# Patient Record
Sex: MALE | Race: WHITE | NOT HISPANIC OR LATINO | Employment: OTHER | URBAN - NONMETROPOLITAN AREA
[De-identification: names, ages, dates, MRNs, and addresses within clinical notes are randomized per-mention and may not be internally consistent; named-entity substitution may affect disease eponyms.]

---

## 2020-11-30 ENCOUNTER — OFFICE VISIT (OUTPATIENT)
Dept: CARDIOLOGY | Facility: CLINIC | Age: 45
End: 2020-11-30

## 2020-11-30 VITALS
BODY MASS INDEX: 42.66 KG/M2 | HEART RATE: 75 BPM | DIASTOLIC BLOOD PRESSURE: 89 MMHG | HEIGHT: 72 IN | WEIGHT: 315 LBS | OXYGEN SATURATION: 97 % | SYSTOLIC BLOOD PRESSURE: 146 MMHG | TEMPERATURE: 97.8 F

## 2020-11-30 DIAGNOSIS — R07.2 PRECORDIAL PAIN: ICD-10-CM

## 2020-11-30 DIAGNOSIS — R06.02 SOB (SHORTNESS OF BREATH): ICD-10-CM

## 2020-11-30 DIAGNOSIS — I10 ESSENTIAL HYPERTENSION: ICD-10-CM

## 2020-11-30 DIAGNOSIS — R00.2 PALPITATIONS: ICD-10-CM

## 2020-11-30 PROCEDURE — 99204 OFFICE O/P NEW MOD 45 MIN: CPT | Performed by: PHYSICIAN ASSISTANT

## 2020-11-30 PROCEDURE — 93000 ELECTROCARDIOGRAM COMPLETE: CPT | Performed by: PHYSICIAN ASSISTANT

## 2020-11-30 RX ORDER — LISINOPRIL 10 MG/1
5 TABLET ORAL DAILY
COMMUNITY
Start: 2020-10-16

## 2020-11-30 RX ORDER — FLUTICASONE PROPIONATE 50 MCG
2 SPRAY, SUSPENSION (ML) NASAL DAILY
COMMUNITY

## 2020-11-30 RX ORDER — CETIRIZINE HYDROCHLORIDE 10 MG/1
TABLET ORAL DAILY
COMMUNITY
Start: 2020-11-17

## 2020-11-30 RX ORDER — CYPROHEPTADINE HYDROCHLORIDE 4 MG/1
TABLET ORAL 2 TIMES DAILY
COMMUNITY
Start: 2020-11-06

## 2020-11-30 NOTE — PATIENT INSTRUCTIONS

## 2020-11-30 NOTE — PROGRESS NOTES
Subjective   Monster Moore is a 44 y.o. male     Chief Complaint   Patient presents with   • Establish Care     presents for cardiac eval   • Palpitations   • Shortness of Breath       HPI  The patient presents to reestablish care, referred because of symptoms of chest discomfort, dyspnea, and palpitations otherwise.  The patient was seen a number of years ago through the clinic and had a largely benign cardiovascular work-up at that time.  He was lost to follow-up for unknown reasons but is referred back after reporting symptoms to his primary care provider.  The patient was diagnosed with coronavirus in August.  Since that time, he has started noticing the above listed symptoms.  He reports increasing palpitations, both consistent with palpitations and tachycardia.  Rarely this can be associated with dizziness.  The majority of his dizziness however is consistent with orthostasis of note.  He has had no syncope.  He also describes chest discomfort.  He reports more of a pressure sensation in the precordium.  He has no associated neck, arm, or jaw discomfort at that time.  His baseline dyspnea is progressed and is worse with the symptoms of palpitations and chest discomfort otherwise.  The patient was concerned enough about symptoms that he eventually went to see his primary care provider and has now been referred on back to the clinic today for consideration of evaluation.  Currently, he does note that he takes only 10 mg half a tablet daily of lisinopril.  Occasionally he does have hypertension.  We have discussed with him how to titrate that on up to full dosing at 10 mg if needed.  The patient has no further complaints.      Current Outpatient Medications   Medication Sig Dispense Refill   • cetirizine (zyrTEC) 10 MG tablet Daily.     • cyproheptadine (PERIACTIN) 4 MG tablet 2 (two) times a day.     • fluticasone (FLONASE) 50 MCG/ACT nasal spray 2 sprays into the nostril(s) as directed by provider Daily.     •  lisinopril (PRINIVIL,ZESTRIL) 10 MG tablet Take 5 mg by mouth Daily.       No current facility-administered medications for this visit.        Patient has no known allergies.    Past Medical History:   Diagnosis Date   • COVID-19 08/2020   • Hypertension    • Sleep apnea        Social History     Socioeconomic History   • Marital status:      Spouse name: Not on file   • Number of children: Not on file   • Years of education: Not on file   • Highest education level: Not on file   Tobacco Use   • Smoking status: Never Smoker   • Smokeless tobacco: Never Used   Substance and Sexual Activity   • Alcohol use: Never     Frequency: Never   • Drug use: Never       Family History   Problem Relation Age of Onset   • Diabetes Mother    • Emphysema Father        Review of Systems   Constitutional: Positive for fatigue (released from Covid in August). Negative for chills, diaphoresis and fever.   HENT: Positive for trouble swallowing.         Sinus issues   Eyes: Negative.    Respiratory: Positive for apnea (cpap), shortness of breath (with increased activity) and wheezing (occas). Negative for cough and chest tightness.    Cardiovascular: Positive for chest pain (1 episode pressure on exertion), palpitations (occas pounding) and leg swelling (mild ).   Gastrointestinal: Positive for diarrhea and nausea. Negative for abdominal pain, blood in stool, constipation and vomiting.   Endocrine: Negative.    Genitourinary: Negative.  Negative for hematuria.   Musculoskeletal: Positive for arthralgias and back pain. Negative for myalgias and neck pain.   Skin: Negative.    Allergic/Immunologic: Positive for environmental allergies. Negative for food allergies.   Neurological: Positive for numbness (jaw ). Negative for dizziness, syncope, weakness, light-headedness and headaches.   Hematological: Negative.  Does not bruise/bleed easily.   Psychiatric/Behavioral: Positive for agitation and sleep disturbance (insomnia). The patient  "is nervous/anxious.         Anxiety from having Covid, released in August       Objective     Vitals:    11/30/20 1052   BP: 146/89   BP Location: Left arm   Patient Position: Sitting   Pulse: 75   Temp: 97.8 °F (36.6 °C)   SpO2: 97%   Weight: (!) 172 kg (378 lb 3.2 oz)   Height: 182.9 cm (72\")        /89 (BP Location: Left arm, Patient Position: Sitting)   Pulse 75   Temp 97.8 °F (36.6 °C)   Ht 182.9 cm (72\")   Wt (!) 172 kg (378 lb 3.2 oz)   SpO2 97%   BMI 51.29 kg/m²      Lab Results (most recent)     None          Physical Exam  Vitals signs and nursing note reviewed.   Constitutional:       General: He is not in acute distress.     Appearance: He is well-developed.   HENT:      Head: Normocephalic and atraumatic.   Eyes:      Conjunctiva/sclera: Conjunctivae normal.      Pupils: Pupils are equal, round, and reactive to light.   Neck:      Musculoskeletal: Normal range of motion and neck supple.      Vascular: No JVD.      Trachea: No tracheal deviation.   Cardiovascular:      Rate and Rhythm: Normal rate and regular rhythm.      Heart sounds: Normal heart sounds.   Pulmonary:      Effort: Pulmonary effort is normal.      Breath sounds: Normal breath sounds.   Abdominal:      General: Bowel sounds are normal. There is no distension.      Palpations: Abdomen is soft. There is no mass.      Tenderness: There is no abdominal tenderness. There is no guarding or rebound.   Musculoskeletal: Normal range of motion.         General: No tenderness or deformity.   Skin:     General: Skin is warm and dry.      Coloration: Skin is not pale.      Findings: No erythema or rash.   Neurological:      Mental Status: He is alert and oriented to person, place, and time.   Psychiatric:         Behavior: Behavior normal.         Thought Content: Thought content normal.         Judgment: Judgment normal.         Procedure     ECG 12 Lead    Date/Time: 11/30/2020 11:00 AM  Performed by: Mickey Vergara PA  Authorized by: " Mickey Vergara PA   Comparison: compared with previous ECG from 6/2/2020  Comparison to previous ECG: Sinus rhythm at 73, normal axis, no acute changes noted.                   Assessment/Plan      Diagnosis Plan   1. Palpitations  ECG 12 Lead    Adult Transthoracic Echo Complete W/ Cont if Necessary Per Protocol    Cardiac Event Monitor    Treadmill Stress Test   2. Essential hypertension  ECG 12 Lead    Adult Transthoracic Echo Complete W/ Cont if Necessary Per Protocol    Cardiac Event Monitor    Treadmill Stress Test   3. SOB (shortness of breath)  ECG 12 Lead    Adult Transthoracic Echo Complete W/ Cont if Necessary Per Protocol    Cardiac Event Monitor    Treadmill Stress Test   4. Precordial pain       1.  The patient reports increasing palpitations, chest discomfort, and dyspnea since his diagnosis of coronavirus in August.  Still, he wants evaluation to ensure no cardiac involvement from coronavirus or cardiac etiology otherwise to explain symptoms.    2.  In that setting, I would schedule for an event monitor to screen for dysrhythmic substrates.    3.  He will be scheduled for regular treadmill stress test for ischemia assessment and risk stratification otherwise.    4.  The patient also will be scheduled for an echocardiogram to evaluate LV size and function, valvular morphologies, etc.    5.  We have discussed how to titrate lisinopril at home as needed for blood pressure control.  He will monitor that closely and call to us for any issues.    6.  I will to see him back as soon as we have results of the above studies and will recommend him further at that time.  He will call for any issues prior to follow-up.           Monster Moore  reports that he has never smoked. He has never used smokeless tobacco..      Patient's Body mass index is 51.29 kg/m². BMI is above normal parameters. Recommendations include: educational material.           Electronically signed by:

## 2020-12-09 ENCOUNTER — OUTSIDE FACILITY SERVICE (OUTPATIENT)
Dept: CARDIOLOGY | Facility: CLINIC | Age: 45
End: 2020-12-09

## 2020-12-09 PROCEDURE — 93272 ECG/REVIEW INTERPRET ONLY: CPT | Performed by: INTERNAL MEDICINE

## 2020-12-18 ENCOUNTER — HOSPITAL ENCOUNTER (OUTPATIENT)
Dept: CARDIOLOGY | Facility: HOSPITAL | Age: 45
Discharge: HOME | End: 2020-12-18

## 2020-12-18 VITALS — WEIGHT: 315 LBS | BODY MASS INDEX: 42.66 KG/M2 | HEIGHT: 72 IN

## 2020-12-18 DIAGNOSIS — R06.02 SOB (SHORTNESS OF BREATH): ICD-10-CM

## 2020-12-18 DIAGNOSIS — I10 ESSENTIAL HYPERTENSION: ICD-10-CM

## 2020-12-18 DIAGNOSIS — R00.2 PALPITATIONS: ICD-10-CM

## 2020-12-18 PROCEDURE — 93017 CV STRESS TEST TRACING ONLY: CPT

## 2020-12-18 PROCEDURE — 93018 CV STRESS TEST I&R ONLY: CPT | Performed by: INTERNAL MEDICINE

## 2020-12-18 PROCEDURE — 93306 TTE W/DOPPLER COMPLETE: CPT

## 2020-12-18 PROCEDURE — 93306 TTE W/DOPPLER COMPLETE: CPT | Performed by: INTERNAL MEDICINE

## 2020-12-19 LAB
BH CV STRESS BP STAGE 1: NORMAL
BH CV STRESS BP STAGE 2: NORMAL
BH CV STRESS DURATION MIN STAGE 1: 3
BH CV STRESS DURATION MIN STAGE 2: 3
BH CV STRESS DURATION SEC STAGE 1: 0
BH CV STRESS DURATION SEC STAGE 2: 0
BH CV STRESS GRADE STAGE 1: 10
BH CV STRESS GRADE STAGE 2: 12
BH CV STRESS HR STAGE 1: 115
BH CV STRESS HR STAGE 2: 164
BH CV STRESS METS STAGE 1: 5
BH CV STRESS METS STAGE 2: 7.5
BH CV STRESS PROTOCOL 1: NORMAL
BH CV STRESS RECOVERY BP: NORMAL MMHG
BH CV STRESS RECOVERY HR: 96 BPM
BH CV STRESS SPEED STAGE 1: 1.7
BH CV STRESS SPEED STAGE 2: 2.5
BH CV STRESS STAGE 1: 1
BH CV STRESS STAGE 2: 2
MAXIMAL PREDICTED HEART RATE: 177 BPM
PERCENT MAX PREDICTED HR: 92.66 %
STRESS BASELINE BP: NORMAL MMHG
STRESS BASELINE HR: 70 BPM
STRESS PERCENT HR: 109 %
STRESS POST ESTIMATED WORKLOAD: 7.2 METS
STRESS POST EXERCISE DUR MIN: 6 MIN
STRESS POST EXERCISE DUR SEC: 5 SEC
STRESS POST PEAK BP: NORMAL MMHG
STRESS POST PEAK HR: 164 BPM
STRESS TARGET HR: 150 BPM

## 2020-12-21 ENCOUNTER — TELEPHONE (OUTPATIENT)
Dept: CARDIOLOGY | Facility: CLINIC | Age: 45
End: 2020-12-21

## 2020-12-21 NOTE — TELEPHONE ENCOUNTER
Patient aware of stress test results. He will keep follow up as scheduled and call with any concerns. Gina Shafer MA      ----- Message from CUCA Petersen sent at 12/21/2020  8:44 AM EST -----  Routine follow-up.    Result Text     1.  Poor functional capacity without chest pain.  The patient reported dyspnea during the study while exercising 6 minutes and 5 seconds on a Juwan protocol to a heart rate of 164, systolic blood pressure 142, and to 7.2 METS.     2.  Physiologic heart rate and blood pressure responses to exercise, somewhat exaggerated suggestive of physical deconditioning or other noncardiac pathology.     3.  No EKG evidence of ischemia.     4.  No exercise-induced dysrhythmia.

## 2021-01-07 LAB
BH CV ECHO MEAS - ACS: 1.5 CM
BH CV ECHO MEAS - AO MAX PG (FULL): -1 MMHG
BH CV ECHO MEAS - AO MAX PG: 2.9 MMHG
BH CV ECHO MEAS - AO MEAN PG (FULL): 1 MMHG
BH CV ECHO MEAS - AO MEAN PG: 2 MMHG
BH CV ECHO MEAS - AO ROOT AREA (BSA CORRECTED): 1.2
BH CV ECHO MEAS - AO ROOT AREA: 9.1 CM^2
BH CV ECHO MEAS - AO ROOT DIAM: 3.4 CM
BH CV ECHO MEAS - AO V2 MAX: 84.6 CM/SEC
BH CV ECHO MEAS - AO V2 MEAN: 57.5 CM/SEC
BH CV ECHO MEAS - AO V2 VTI: 17.9 CM
BH CV ECHO MEAS - BSA(HAYCOCK): 3 M^2
BH CV ECHO MEAS - BSA: 2.8 M^2
BH CV ECHO MEAS - BZI_BMI: 51.3 KILOGRAMS/M^2
BH CV ECHO MEAS - BZI_METRIC_HEIGHT: 182.9 CM
BH CV ECHO MEAS - BZI_METRIC_WEIGHT: 171.5 KG
BH CV ECHO MEAS - EDV(CUBED): 161.9 ML
BH CV ECHO MEAS - EDV(TEICH): 144.4 ML
BH CV ECHO MEAS - EF(CUBED): 69.7 %
BH CV ECHO MEAS - EF(TEICH): 60.8 %
BH CV ECHO MEAS - ESV(CUBED): 49 ML
BH CV ECHO MEAS - ESV(TEICH): 56.6 ML
BH CV ECHO MEAS - FS: 32.8 %
BH CV ECHO MEAS - IVS/LVPW: 1.1
BH CV ECHO MEAS - IVSD: 1.3 CM
BH CV ECHO MEAS - LA DIMENSION: 4.5 CM
BH CV ECHO MEAS - LA/AO: 1.3
BH CV ECHO MEAS - LAT PEAK E' VEL: 8.8 CM/SEC
BH CV ECHO MEAS - LV IVRT: 0.13 SEC
BH CV ECHO MEAS - LV MASS(C)D: 295.1 GRAMS
BH CV ECHO MEAS - LV MASS(C)DI: 105.7 GRAMS/M^2
BH CV ECHO MEAS - LV MAX PG: 3.9 MMHG
BH CV ECHO MEAS - LV MEAN PG: 1 MMHG
BH CV ECHO MEAS - LV V1 MAX: 98.6 CM/SEC
BH CV ECHO MEAS - LV V1 MEAN: 51.8 CM/SEC
BH CV ECHO MEAS - LV V1 VTI: 19.1 CM
BH CV ECHO MEAS - LVIDD: 5.5 CM
BH CV ECHO MEAS - LVIDS: 3.7 CM
BH CV ECHO MEAS - LVPWD: 1.2 CM
BH CV ECHO MEAS - MED PEAK E' VEL: 9.3 CM/SEC
BH CV ECHO MEAS - MV A MAX VEL: 55.5 CM/SEC
BH CV ECHO MEAS - MV DEC SLOPE: 310 CM/SEC^2
BH CV ECHO MEAS - MV DEC TIME: 0.2 SEC
BH CV ECHO MEAS - MV E MAX VEL: 86.9 CM/SEC
BH CV ECHO MEAS - MV E/A: 1.6
BH CV ECHO MEAS - MV MAX PG: 2.2 MMHG
BH CV ECHO MEAS - MV MEAN PG: 1 MMHG
BH CV ECHO MEAS - MV P1/2T MAX VEL: 86.9 CM/SEC
BH CV ECHO MEAS - MV P1/2T: 82.1 MSEC
BH CV ECHO MEAS - MV V2 MAX: 74.6 CM/SEC
BH CV ECHO MEAS - MV V2 MEAN: 48.1 CM/SEC
BH CV ECHO MEAS - MV V2 VTI: 26.3 CM
BH CV ECHO MEAS - MVA P1/2T LCG: 2.5 CM^2
BH CV ECHO MEAS - MVA(P1/2T): 2.7 CM^2
BH CV ECHO MEAS - PA MAX PG (FULL): 1.2 MMHG
BH CV ECHO MEAS - PA MAX PG: 5.6 MMHG
BH CV ECHO MEAS - PA MEAN PG (FULL): 1 MMHG
BH CV ECHO MEAS - PA MEAN PG: 3 MMHG
BH CV ECHO MEAS - PA V2 MAX: 118 CM/SEC
BH CV ECHO MEAS - PA V2 MEAN: 73.3 CM/SEC
BH CV ECHO MEAS - PA V2 VTI: 23.4 CM
BH CV ECHO MEAS - RAP SYSTOLE: 10 MMHG
BH CV ECHO MEAS - RV MAX PG: 4.4 MMHG
BH CV ECHO MEAS - RV MEAN PG: 2 MMHG
BH CV ECHO MEAS - RV V1 MAX: 105 CM/SEC
BH CV ECHO MEAS - RV V1 MEAN: 54.5 CM/SEC
BH CV ECHO MEAS - RV V1 VTI: 23.5 CM
BH CV ECHO MEAS - RVDD: 4 CM
BH CV ECHO MEAS - RVSP: 18.4 MMHG
BH CV ECHO MEAS - SI(AO): 58.2 ML/M^2
BH CV ECHO MEAS - SI(CUBED): 40.4 ML/M^2
BH CV ECHO MEAS - SI(TEICH): 31.4 ML/M^2
BH CV ECHO MEAS - SV(AO): 162.5 ML
BH CV ECHO MEAS - SV(CUBED): 112.9 ML
BH CV ECHO MEAS - SV(TEICH): 87.7 ML
BH CV ECHO MEAS - TR MAX VEL: 145 CM/SEC
BH CV ECHO MEASUREMENTS AVERAGE E/E' RATIO: 9.6
MAXIMAL PREDICTED HEART RATE: 177 BPM
STRESS TARGET HR: 150 BPM

## 2021-01-11 ENCOUNTER — TELEPHONE (OUTPATIENT)
Dept: CARDIOLOGY | Facility: CLINIC | Age: 46
End: 2021-01-11

## 2021-01-11 NOTE — TELEPHONE ENCOUNTER
----- Message from Gina Shafer MA sent at 1/11/2021 11:17 AM EST -----    ----- Message -----  From: Mickey Vergara PA  Sent: 1/8/2021   8:41 AM EST  To: Gina Shafer MA    Routine follow-up.

## 2021-01-11 NOTE — TELEPHONE ENCOUNTER
Echo:  Left ventricular ejection fraction appears to be 51 - 55%.    Keep appt on 3/31/21 at 11:15am      Called and informed pt of the above, he verbalized understanding.

## 2021-01-21 ENCOUNTER — APPOINTMENT (OUTPATIENT)
Dept: CARDIOLOGY | Facility: HOSPITAL | Age: 46
End: 2021-01-21

## 2021-03-31 ENCOUNTER — OFFICE VISIT (OUTPATIENT)
Dept: CARDIOLOGY | Facility: CLINIC | Age: 46
End: 2021-03-31

## 2021-03-31 VITALS
BODY MASS INDEX: 42.66 KG/M2 | DIASTOLIC BLOOD PRESSURE: 96 MMHG | HEIGHT: 72 IN | HEART RATE: 77 BPM | SYSTOLIC BLOOD PRESSURE: 158 MMHG | WEIGHT: 315 LBS | OXYGEN SATURATION: 99 %

## 2021-03-31 DIAGNOSIS — R07.2 PRECORDIAL PAIN: ICD-10-CM

## 2021-03-31 DIAGNOSIS — R00.2 PALPITATIONS: Primary | ICD-10-CM

## 2021-03-31 DIAGNOSIS — R06.02 SOB (SHORTNESS OF BREATH): ICD-10-CM

## 2021-03-31 PROCEDURE — 99213 OFFICE O/P EST LOW 20 MIN: CPT | Performed by: PHYSICIAN ASSISTANT

## 2021-03-31 RX ORDER — LOSARTAN POTASSIUM 25 MG/1
25 TABLET ORAL DAILY
COMMUNITY

## 2021-03-31 NOTE — PROGRESS NOTES
Problem list     Subjective   Monster Moore is a 45 y.o. male     Chief Complaint   Patient presents with   • Follow-up     testing        HPI  The patient presents back to the clinic today for routine follow-up.  We had initially seen him for symptoms, which become quite significant, after Covid diagnosis.  He was subsequent scheduled for testing.  Stress test indicated no evidence of ischemia.  Echo indicated preserved systolic function with no significant structural abnormalities noted otherwise.  His event monitor indicated no significant dysrhythmic activity.  He had no rate or rhythm disturbance of any significance.    At this time, the patient reports that he has improved.  His previously described episodes of palpitations, which was fairly significant prior to testing, have basically resolved.  He has no further chest pain.  His dyspnea and fatigue are baseline symptoms for him.  He has no further complaints otherwise and feels that he is much improved and overall doing well at this time.    Current Outpatient Medications on File Prior to Visit   Medication Sig Dispense Refill   • cetirizine (zyrTEC) 10 MG tablet Daily.     • fluticasone (FLONASE) 50 MCG/ACT nasal spray 2 sprays into the nostril(s) as directed by provider Daily.     • losartan (COZAAR) 25 MG tablet Take 25 mg by mouth Daily.     • cyproheptadine (PERIACTIN) 4 MG tablet 2 (two) times a day.     • lisinopril (PRINIVIL,ZESTRIL) 10 MG tablet Take 5 mg by mouth Daily.       No current facility-administered medications on file prior to visit.       Patient has no known allergies.    Past Medical History:   Diagnosis Date   • COVID-19 08/2020   • Hypertension    • Sleep apnea        Social History     Socioeconomic History   • Marital status:      Spouse name: Not on file   • Number of children: Not on file   • Years of education: Not on file   • Highest education level: Not on file   Tobacco Use   • Smoking status: Never Smoker   •  "Smokeless tobacco: Never Used   Substance and Sexual Activity   • Alcohol use: Never   • Drug use: Never       Family History   Problem Relation Age of Onset   • Diabetes Mother    • Emphysema Father        Review of Systems   Constitutional: Positive for fatigue. Negative for chills and fever.   HENT: Negative.  Negative for congestion, rhinorrhea and sore throat.    Eyes: Negative.  Negative for visual disturbance.   Respiratory: Positive for chest tightness, shortness of breath and wheezing.    Cardiovascular: Positive for chest pain and leg swelling. Negative for palpitations.   Gastrointestinal: Negative.    Endocrine: Negative.  Negative for cold intolerance.   Genitourinary: Negative.    Musculoskeletal: Positive for arthralgias, back pain and neck pain.   Skin: Positive for rash (shingles - healing ). Negative for wound.   Allergic/Immunologic: Positive for environmental allergies.   Neurological: Negative.  Negative for dizziness, weakness, numbness and headaches.   Hematological: Negative.  Does not bruise/bleed easily.   Psychiatric/Behavioral: Positive for sleep disturbance (c-pap, staying asleep ).       Objective   Vitals:    03/31/21 1201   BP: 158/96   BP Location: Left arm   Patient Position: Sitting   Pulse: 77   SpO2: 99%   Weight: (!) 182 kg (401 lb 6.4 oz)   Height: 182.9 cm (72\")      /96 (BP Location: Left arm, Patient Position: Sitting)   Pulse 77   Ht 182.9 cm (72\")   Wt (!) 182 kg (401 lb 6.4 oz)   SpO2 99%   BMI 54.44 kg/m²    Lab Results (most recent)     None        Physical Exam  Vitals and nursing note reviewed.   Constitutional:       General: He is not in acute distress.     Appearance: He is well-developed. He is obese.   HENT:      Head: Normocephalic and atraumatic.   Eyes:      Conjunctiva/sclera: Conjunctivae normal.      Pupils: Pupils are equal, round, and reactive to light.   Neck:      Vascular: No JVD.      Trachea: No tracheal deviation.   Cardiovascular:      " Rate and Rhythm: Normal rate and regular rhythm.      Heart sounds: Normal heart sounds.   Pulmonary:      Effort: Pulmonary effort is normal.      Breath sounds: Normal breath sounds.   Abdominal:      General: Bowel sounds are normal. There is no distension.      Palpations: Abdomen is soft. There is no mass.      Tenderness: There is no abdominal tenderness. There is no guarding or rebound.   Musculoskeletal:         General: No tenderness or deformity. Normal range of motion.      Cervical back: Normal range of motion and neck supple.   Skin:     General: Skin is warm and dry.      Coloration: Skin is not pale.      Findings: No erythema or rash.   Neurological:      Mental Status: He is alert and oriented to person, place, and time.   Psychiatric:         Behavior: Behavior normal.         Thought Content: Thought content normal.         Judgment: Judgment normal.           Procedure   Procedures       Assessment/Plan      Diagnosis Plan   1. Palpitations     2. SOB (shortness of breath)     3. Precordial pain       1.  Overall, the patient is much improved.  His chest pain is resolved.  His palpitations have minimized.  His dyspnea appears to be at baseline.  His symptoms have become quite significant after diagnosis with Covid.    2.  His cardiac work-up, and to evaluate symptoms as above, and has included a stress test, echo, and an event monitor.  Those studies were all very much benign, as reviewed in detail with the patient, and as above.    3.  I would continue medical regimen without change.  For change in clinical course, he will call immediately.  We will continue to see this gentleman on a yearly evaluation.  He will call for any issues.           Monster Moore  reports that he has never smoked. He has never used smokeless tobacco..          Patient's Body mass index is 54.44 kg/m². BMI is above normal parameters. Recommendations include: educational material.             Electronically signed  by:

## 2021-03-31 NOTE — PATIENT INSTRUCTIONS
